# Patient Record
Sex: MALE | Race: WHITE | NOT HISPANIC OR LATINO | URBAN - METROPOLITAN AREA
[De-identification: names, ages, dates, MRNs, and addresses within clinical notes are randomized per-mention and may not be internally consistent; named-entity substitution may affect disease eponyms.]

---

## 2023-10-11 ENCOUNTER — OFFICE VISIT (OUTPATIENT)
Dept: URGENT CARE | Facility: CLINIC | Age: 18
End: 2023-10-11
Payer: COMMERCIAL

## 2023-10-11 VITALS
HEART RATE: 81 BPM | OXYGEN SATURATION: 98 % | TEMPERATURE: 97.4 F | SYSTOLIC BLOOD PRESSURE: 147 MMHG | DIASTOLIC BLOOD PRESSURE: 83 MMHG | WEIGHT: 160 LBS

## 2023-10-11 DIAGNOSIS — R10.9 ACUTE RIGHT FLANK PAIN: Primary | ICD-10-CM

## 2023-10-11 DIAGNOSIS — R11.2 NAUSEA AND VOMITING, UNSPECIFIED VOMITING TYPE: ICD-10-CM

## 2023-10-11 LAB
POC APPEARANCE, URINE: ABNORMAL
POC BILIRUBIN, URINE: ABNORMAL
POC BLOOD, URINE: ABNORMAL
POC COLOR, URINE: ABNORMAL
POC GLUCOSE, URINE: NEGATIVE MG/DL
POC KETONES, URINE: ABNORMAL MG/DL
POC LEUKOCYTES, URINE: NEGATIVE
POC NITRITE,URINE: NEGATIVE
POC PH, URINE: 7 PH
POC PROTEIN, URINE: ABNORMAL MG/DL
POC SPECIFIC GRAVITY, URINE: 1.02
POC UROBILINOGEN, URINE: 0.2 EU/DL

## 2023-10-11 PROCEDURE — 81003 URINALYSIS AUTO W/O SCOPE: CPT

## 2023-10-11 PROCEDURE — 99203 OFFICE O/P NEW LOW 30 MIN: CPT

## 2023-10-11 RX ORDER — ONDANSETRON 4 MG/1
4 TABLET, FILM COATED ORAL EVERY 8 HOURS PRN
Qty: 20 TABLET | Refills: 0 | Status: SHIPPED | OUTPATIENT
Start: 2023-10-11 | End: 2023-10-18

## 2023-10-11 RX ORDER — TAMSULOSIN HYDROCHLORIDE 0.4 MG/1
0.4 CAPSULE ORAL DAILY
Qty: 14 CAPSULE | Refills: 0 | Status: SHIPPED | OUTPATIENT
Start: 2023-10-11 | End: 2023-10-25

## 2023-10-11 RX ORDER — FLUTICASONE PROPIONATE 50 MCG
1 SPRAY, SUSPENSION (ML) NASAL
COMMUNITY

## 2023-10-11 RX ORDER — ALBUTEROL SULFATE 0.83 MG/ML
2.5 SOLUTION RESPIRATORY (INHALATION) EVERY 4 HOURS PRN
COMMUNITY

## 2023-10-11 ASSESSMENT — ENCOUNTER SYMPTOMS
VOMITING: 1
LIGHT-HEADEDNESS: 0
MYALGIAS: 1
ABDOMINAL PAIN: 0
NEUROLOGICAL NEGATIVE: 1
HEADACHES: 0
CHILLS: 0
DIARRHEA: 1
CONSTITUTIONAL NEGATIVE: 1
BACK PAIN: 1
NAUSEA: 1
FEVER: 0
HEMATURIA: 1
FREQUENCY: 0
DIFFICULTY URINATING: 0
JOINT SWELLING: 0

## 2023-10-11 NOTE — PROGRESS NOTES
Subjective     Angelo Mancini is a 18 y.o. male who presents for Vomiting.    Patient presents with reports of nausea and vomiting for the last 5 days. He states that he had a couple sips of fruity drink that was supposed to be refrigerated, but was not on Friday. He states that Saturday he woke up with nausea and vomiting (6 episodes) with loose stools all. He states that on Sunday he felt mostly better with some back pain, but he also noticed blood in his urine that day. Patient reports that the last couple days he has felt okay, but today he woke up with the nausea and malaise. He states that he has not been drinking as much water the last several days. He states that is still having some pain in his right low back and mild discomfort in his right testicle. He states that he is a heavy daily marijuana smoker, but has not had issues with nausea/vomiting in the past. He reports decreased urination today - last was yesterday. He denies new sexual partners or concern for STDs.       History provided by:  Patient  Vomiting  Severity:  Moderate  Timing:  Sporadic  Quality:  Bilious material  Able to tolerate:  Solids  How soon after eating does vomiting occur:  3 minutes  Progression:  Partially resolved  Chronicity:  New  Recent urination:  Decreased  Relieved by:  Liquids  Ineffective treatments:  None tried  Associated symptoms: diarrhea and myalgias    Associated symptoms: no abdominal pain, no chills, no fever and no headaches    Risk factors: alcohol use        /83   Pulse 81   Temp 36.3 °C (97.4 °F)   Wt 72.6 kg (160 lb)   SpO2 98%    All vitals have been reviewed and are stable.    Review of Systems   Constitutional: Negative.  Negative for chills and fever.   Respiratory: Negative.  Negative for chest tightness and shortness of breath.    Cardiovascular: Negative.  Negative for chest pain.   Gastrointestinal:  Positive for diarrhea, nausea and vomiting. Negative for abdominal pain.   Genitourinary:   Positive for decreased urine volume, hematuria and testicular pain. Negative for difficulty urinating, enuresis, frequency, penile discharge, penile pain, scrotal swelling and urgency.   Musculoskeletal:  Positive for back pain and myalgias. Negative for joint swelling.   Skin: Negative.  Negative for rash.   Neurological: Negative.  Negative for light-headedness and headaches.       Objective   Physical Exam  Vitals and nursing note reviewed.   Constitutional:       General: He is awake. He is not in acute distress.     Appearance: Normal appearance. He is well-developed.   HENT:      Head: Normocephalic and atraumatic.      Nose: Nose normal.      Mouth/Throat:      Lips: Pink.      Mouth: Mucous membranes are moist.      Pharynx: Oropharynx is clear.   Eyes:      Extraocular Movements: Extraocular movements intact.      Conjunctiva/sclera: Conjunctivae normal.      Pupils: Pupils are equal, round, and reactive to light.   Cardiovascular:      Rate and Rhythm: Normal rate and regular rhythm.   Pulmonary:      Effort: Pulmonary effort is normal. No respiratory distress.   Abdominal:      General: Abdomen is flat. Bowel sounds are normal. There is no distension.      Palpations: Abdomen is soft.      Tenderness: There is no abdominal tenderness. There is no right CVA tenderness or left CVA tenderness.      Hernia: No hernia is present.   Musculoskeletal:         General: No swelling or deformity. Normal range of motion.      Cervical back: Normal range of motion.   Skin:     General: Skin is warm and dry.   Neurological:      General: No focal deficit present.      Mental Status: He is alert and oriented to person, place, and time. Mental status is at baseline.      Motor: Motor function is intact.      Coordination: Coordination is intact.   Psychiatric:         Mood and Affect: Mood and affect normal.         Speech: Speech normal.         Behavior: Behavior normal.         Thought Content: Thought content normal.          Judgment: Judgment normal.         Assessment/Plan   Problem List Items Addressed This Visit    None  Visit Diagnoses       Acute right flank pain    -  Primary    Relevant Orders    POCT UA Automated manually resulted (Completed)    Nausea and vomiting, unspecified vomiting type                Red flags for reporting to ER have been reviewed with the patient.    Current diagnosis, any medication changes, and all in-office lab or radiologic results have been reviewed with the patient at the time of the visit.   If symptoms do not improve or worsen, patient is to follow up with PCP or report to the emergency room.   Patient is alert and oriented x3 and non-toxic appearing. Vital signs are stable.   Patient and/or guardian has sufficient decision-making capabilities at this time and reports understanding and agreement with the treatment plan made through shared decision-making.

## 2023-10-11 NOTE — PATIENT INSTRUCTIONS
FLANK PAIN / NAUSEA     - Urinalysis was performed in office showing moderate blood, bilirubin, ketones, and protein   - TAMSULOSIN was prescribed to increase urine output - Recommend drinking a lot of water so that urinating often    - Abdominal xray (KUB) was offered in office, but patient declined at this time      - Drink plenty of water, at least 6 12-oz bottles daily, so urine is clear - pale yellow   - Cranberry juice (no added sugar- not cocktail), probiotics, and vitamin C have not been scientifically proven to be beneficial in UTIs, but will not cause harm   - Avoid sugary foods and beverages       - SEEK IMMEDIATE MEDICAL CARE IF: you experience fever, severe back or right lower quadrant pain, rash, nausea, vomiting, or confusion  If symptoms worsen or do not improve in 2-4 days, follow up with PCP, if you need a referral, please call our office.

## 2023-10-11 NOTE — LETTER
October 11, 2023     Patient: Angelo Mancini   YOB: 2005   Date of Visit: 10/11/2023       To Whom It May Concern:    It is my medical opinion that Angelo Mancini should remain out of work until 10/15/23 .    If you have any questions or concerns, please don't hesitate to call.         Sincerely,        DO REMY  SCHEDULE    CC: No Recipients

## 2023-10-31 ASSESSMENT — ENCOUNTER SYMPTOMS
CARDIOVASCULAR NEGATIVE: 1
SHORTNESS OF BREATH: 0
RESPIRATORY NEGATIVE: 1
CHEST TIGHTNESS: 0

## 2024-12-30 ENCOUNTER — OFFICE VISIT (OUTPATIENT)
Dept: URGENT CARE | Facility: CLINIC | Age: 19
End: 2024-12-30
Payer: COMMERCIAL

## 2024-12-30 VITALS
SYSTOLIC BLOOD PRESSURE: 117 MMHG | TEMPERATURE: 99.5 F | HEART RATE: 84 BPM | WEIGHT: 145.8 LBS | OXYGEN SATURATION: 95 % | DIASTOLIC BLOOD PRESSURE: 73 MMHG

## 2024-12-30 DIAGNOSIS — R30.0 DYSURIA: ICD-10-CM

## 2024-12-30 DIAGNOSIS — J40 BRONCHITIS: Primary | ICD-10-CM

## 2024-12-30 LAB
POC APPEARANCE, URINE: CLEAR
POC BILIRUBIN, URINE: ABNORMAL
POC BLOOD, URINE: NEGATIVE
POC COLOR, URINE: ABNORMAL
POC GLUCOSE, URINE: NEGATIVE MG/DL
POC KETONES, URINE: ABNORMAL MG/DL
POC LEUKOCYTES, URINE: NEGATIVE
POC NITRITE,URINE: NEGATIVE
POC PH, URINE: 7.5 PH
POC PROTEIN, URINE: ABNORMAL MG/DL
POC SPECIFIC GRAVITY, URINE: 1.02
POC UROBILINOGEN, URINE: 1 EU/DL

## 2024-12-30 PROCEDURE — 99213 OFFICE O/P EST LOW 20 MIN: CPT | Performed by: NURSE PRACTITIONER

## 2024-12-30 PROCEDURE — 81003 URINALYSIS AUTO W/O SCOPE: CPT | Mod: CLIA WAIVED TEST | Performed by: NURSE PRACTITIONER

## 2024-12-30 RX ORDER — BENZONATATE 200 MG/1
200 CAPSULE ORAL 3 TIMES DAILY PRN
Qty: 20 CAPSULE | Refills: 0 | Status: SHIPPED | OUTPATIENT
Start: 2024-12-30 | End: 2025-01-06

## 2024-12-30 RX ORDER — AZITHROMYCIN 250 MG/1
TABLET, FILM COATED ORAL
Qty: 6 TABLET | Refills: 0 | Status: SHIPPED | OUTPATIENT
Start: 2024-12-30 | End: 2025-01-04

## 2024-12-30 ASSESSMENT — ENCOUNTER SYMPTOMS: COUGH: 1

## 2024-12-30 NOTE — LETTER
December 30, 2024     Patient: Angelo Mancini   YOB: 2005   Date of Visit: 12/30/2024       To Whom It May Concern:    It is my medical opinion that Angelo Mancini may return to work on 1/1/2025 .    If you have any questions or concerns, please don't hesitate to call.         Sincerely,        Kj Loving, TRACY-CNP    CC: No Recipients

## 2024-12-30 NOTE — PROGRESS NOTES
Subjective   Patient ID: Angelo Mancini is a 19 y.o. male.    Patient presents with flank pain, cough, Hx of kidney stones, states that a cough since [place].  Does currently vape, cough produces yellow sputum, denies any wheezing or shortness of breath, he wonders if the low back pain is secondary to his cough but he also is a history of kidney stones.  Denies any hematuria but states the urine was dark 1 time yesterday.  Using over-the-counter therapies the cough  Minimal relief.      History provided by:  Patient  Cough    UTI  Associated symptoms: cough        The following portions of the chart were reviewed this encounter and updated as appropriate:         Review of Systems   Respiratory:  Positive for cough.    All other systems reviewed and are negative.    Objective   Physical Exam  Vitals and nursing note reviewed.   Constitutional:       General: He is not in acute distress.     Appearance: Normal appearance. He is not toxic-appearing.   HENT:      Head: Atraumatic.      Right Ear: External ear normal.      Left Ear: External ear normal.      Nose: Nose normal.      Mouth/Throat:      Mouth: Mucous membranes are moist.   Eyes:      Extraocular Movements: Extraocular movements intact.      Conjunctiva/sclera: Conjunctivae normal.   Cardiovascular:      Rate and Rhythm: Normal rate and regular rhythm.      Heart sounds: No murmur heard.     No gallop.   Pulmonary:      Effort: Pulmonary effort is normal. No respiratory distress.      Breath sounds: Normal breath sounds. No wheezing.   Musculoskeletal:         General: Normal range of motion.      Cervical back: Normal range of motion.   Skin:     General: Skin is warm and dry.      Capillary Refill: Capillary refill takes less than 2 seconds.      Coloration: Skin is not jaundiced.   Neurological:      General: No focal deficit present.      Mental Status: He is alert and oriented to person, place, and time.   Psychiatric:         Mood and Affect:  Mood normal.         Behavior: Behavior normal.         Thought Content: Thought content normal.       Procedures    Assessment/Plan   Diagnoses and all orders for this visit:  Bronchitis  -     azithromycin (Zithromax Z-Venkatesh) 250 mg tablet; Take 2 tablets (500 mg) on  Day 1,  followed by 1 tablet (250 mg) once daily on Days 2 through 5.  -     benzonatate (Tessalon) 200 mg capsule; Take 1 capsule (200 mg) by mouth 3 times a day as needed for cough for up to 7 days. Do not crush or chew.  Dysuria  -     POCT UA Automated manually resulted      Patient disposition: Home

## 2025-05-21 ENCOUNTER — OFFICE VISIT (OUTPATIENT)
Dept: URGENT CARE | Facility: CLINIC | Age: 20
End: 2025-05-21
Payer: COMMERCIAL

## 2025-05-21 VITALS
HEART RATE: 76 BPM | SYSTOLIC BLOOD PRESSURE: 124 MMHG | DIASTOLIC BLOOD PRESSURE: 78 MMHG | HEIGHT: 68 IN | BODY MASS INDEX: 21.95 KG/M2 | OXYGEN SATURATION: 98 % | WEIGHT: 144.8 LBS | TEMPERATURE: 98.1 F

## 2025-05-21 DIAGNOSIS — H66.90 ACUTE OTITIS MEDIA, UNSPECIFIED OTITIS MEDIA TYPE: Primary | ICD-10-CM

## 2025-05-21 PROCEDURE — 99213 OFFICE O/P EST LOW 20 MIN: CPT | Performed by: NURSE PRACTITIONER

## 2025-05-21 PROCEDURE — 3008F BODY MASS INDEX DOCD: CPT | Performed by: NURSE PRACTITIONER

## 2025-05-21 RX ORDER — IBUPROFEN 600 MG/1
600 TABLET, FILM COATED ORAL EVERY 6 HOURS PRN
Qty: 30 TABLET | Refills: 0 | Status: SHIPPED | OUTPATIENT
Start: 2025-05-21 | End: 2025-06-20

## 2025-05-21 RX ORDER — AMOXICILLIN 875 MG/1
875 TABLET, COATED ORAL 2 TIMES DAILY
Qty: 14 TABLET | Refills: 0 | Status: SHIPPED | OUTPATIENT
Start: 2025-05-21 | End: 2025-05-28

## 2025-05-21 NOTE — PROGRESS NOTES
Subjective   Patient ID: Angelo Mancini is a 20 y.o. male.    Bilateral ear pain, glands in neck are swollen x yesterday, states pain over the last 2 days.  States he recently had an upper respiratory infection and the ear pain.  Is a current smoker which would occur more predisposed to an ear infection.  Using Mucinex prior to arrival.  Denies any fever, change in hearing, discharge from the ears      History provided by:  Patient   used: No        The following portions of the chart were reviewed this encounter and updated as appropriate:         Review of Systems   All other systems reviewed and are negative.    Objective   Physical Exam  Vitals and nursing note reviewed.   Constitutional:       General: He is not in acute distress.     Appearance: Normal appearance. He is not toxic-appearing.   HENT:      Head: Atraumatic.      Right Ear: Hearing, ear canal and external ear normal. Tenderness present. Tympanic membrane is injected.      Left Ear: Hearing, ear canal and external ear normal. Tenderness present. Tympanic membrane is injected.      Nose: Nose normal.      Mouth/Throat:      Mouth: Mucous membranes are moist.   Eyes:      Extraocular Movements: Extraocular movements intact.      Conjunctiva/sclera: Conjunctivae normal.   Cardiovascular:      Rate and Rhythm: Normal rate and regular rhythm.      Heart sounds: No murmur heard.     No gallop.   Pulmonary:      Effort: Pulmonary effort is normal. No respiratory distress.      Breath sounds: Normal breath sounds. No wheezing.   Musculoskeletal:         General: Normal range of motion.      Cervical back: Normal range of motion.   Skin:     General: Skin is warm and dry.      Capillary Refill: Capillary refill takes less than 2 seconds.      Coloration: Skin is not jaundiced.   Neurological:      General: No focal deficit present.      Mental Status: He is alert and oriented to person, place, and time.   Psychiatric:         Mood and  Affect: Mood normal.         Behavior: Behavior normal.         Thought Content: Thought content normal.       Procedures    Assessment/Plan   Diagnoses and all orders for this visit:  Acute otitis media, unspecified otitis media type  -     amoxicillin (Amoxil) 875 mg tablet; Take 1 tablet (875 mg) by mouth 2 times a day for 7 days.  -     ibuprofen 600 mg tablet; Take 1 tablet (600 mg) by mouth every 6 hours if needed for moderate pain (4 - 6) or mild pain (1 - 3).    Above plan of care was reviewed with the patient, all questions were answered, through shared decision making the patient agrees with this plan of care.    Red flags for strict return precaution have been reviewed with the patient and or patient gaurdian, patient is alert, oriented and non-toxic appearing, has decision making capabilities and agrees with the plan of care through shared decision making at this time. Current diagnosis, any medication changes, lab or radiologic results have been reviewed with the patient at the time of visit. If symptoms do not improve, or worsen, patient is to follow up with PCP or report to the emergency room.   Patient is alert and oriented x3 vital signs stable nontoxic-appearing and has decision-making capabilities.    Please note that the majority this note was made with Dragon speaking software there may be grammatical errors secondary to the speaking program.    Patient disposition: Home

## 2025-05-21 NOTE — LETTER
May 21, 2025     Patient: Angelo Mancini   YOB: 2005   Date of Visit: 5/21/2025       To Whom It May Concern:    It is my medical opinion that Angelo Mancini may return to work on 5/26/2025.    If you have any questions or concerns, please don't hesitate to call.         Sincerely,        Kj Loving, TRACY-CNP    CC: No Recipients